# Patient Record
Sex: MALE | Race: WHITE | NOT HISPANIC OR LATINO | Employment: OTHER | URBAN - NONMETROPOLITAN AREA
[De-identification: names, ages, dates, MRNs, and addresses within clinical notes are randomized per-mention and may not be internally consistent; named-entity substitution may affect disease eponyms.]

---

## 2018-12-20 ENCOUNTER — HOSPITAL ENCOUNTER (EMERGENCY)
Facility: HOSPITAL | Age: 68
Discharge: HOME | End: 2018-12-20
Attending: EMERGENCY MEDICINE | Admitting: EMERGENCY MEDICINE
Payer: MEDICARE

## 2018-12-20 ENCOUNTER — APPOINTMENT (EMERGENCY)
Dept: RADIOLOGY | Facility: HOSPITAL | Age: 68
End: 2018-12-20
Payer: MEDICARE

## 2018-12-20 VITALS
HEART RATE: 105 BPM | OXYGEN SATURATION: 91 % | RESPIRATION RATE: 20 BRPM | BODY MASS INDEX: 19.03 KG/M2 | WEIGHT: 132.94 LBS | HEIGHT: 70 IN | TEMPERATURE: 97.2 F | SYSTOLIC BLOOD PRESSURE: 119 MMHG | DIASTOLIC BLOOD PRESSURE: 65 MMHG

## 2018-12-20 DIAGNOSIS — G89.29 CHRONIC RIGHT HIP PAIN: ICD-10-CM

## 2018-12-20 DIAGNOSIS — T40.2X1A OPIOID OVERDOSE, ACCIDENTAL OR UNINTENTIONAL, INITIAL ENCOUNTER (HCC): Primary | ICD-10-CM

## 2018-12-20 DIAGNOSIS — M25.551 CHRONIC RIGHT HIP PAIN: ICD-10-CM

## 2018-12-20 LAB
ACETONE SERPL-MCNC: NEGATIVE MG/DL
ANION GAP SERPL CALCULATED.3IONS-SCNC: 16 MMOL/L (ref 4–13)
APAP SERPL-MCNC: <2 UG/ML (ref 10–30)
BASE EX.OXY STD BLDV CALC-SCNC: 38.1 % (ref 60–80)
BASE EX.OXY STD BLDV CALC-SCNC: 82.6 % (ref 60–80)
BASE EXCESS BLDV CALC-SCNC: -15.3 MMOL/L
BASE EXCESS BLDV CALC-SCNC: -6.4 MMOL/L
BASOPHILS # BLD AUTO: 0.05 THOUSANDS/ΜL (ref 0–0.1)
BASOPHILS NFR BLD AUTO: 0 % (ref 0–1)
BUN SERPL-MCNC: 19 MG/DL (ref 5–25)
CALCIUM SERPL-MCNC: 8.8 MG/DL (ref 8.3–10.1)
CHLORIDE SERPL-SCNC: 100 MMOL/L (ref 100–108)
CO2 SERPL-SCNC: 22 MMOL/L (ref 21–32)
CREAT SERPL-MCNC: 1.1 MG/DL (ref 0.6–1.3)
EOSINOPHIL # BLD AUTO: 0.13 THOUSAND/ΜL (ref 0–0.61)
EOSINOPHIL NFR BLD AUTO: 1 % (ref 0–6)
ERYTHROCYTE [DISTWIDTH] IN BLOOD BY AUTOMATED COUNT: 19.6 % (ref 11.6–15.1)
ETHANOL SERPL-MCNC: <3 MG/DL (ref 0–3)
GFR SERPL CREATININE-BSD FRML MDRD: 70 ML/MIN/1.73SQ M
GLUCOSE SERPL-MCNC: 204 MG/DL (ref 65–140)
HCO3 BLDV-SCNC: 17 MMOL/L (ref 24–30)
HCO3 BLDV-SCNC: 22.4 MMOL/L (ref 24–30)
HCT VFR BLD AUTO: 45.8 % (ref 36.5–49.3)
HGB BLD-MCNC: 12.9 G/DL (ref 12–17)
IMM GRANULOCYTES # BLD AUTO: 0.13 THOUSAND/UL (ref 0–0.2)
IMM GRANULOCYTES NFR BLD AUTO: 1 % (ref 0–2)
LYMPHOCYTES # BLD AUTO: 1.76 THOUSANDS/ΜL (ref 0.6–4.47)
LYMPHOCYTES NFR BLD AUTO: 10 % (ref 14–44)
MAGNESIUM SERPL-MCNC: 2.4 MG/DL (ref 1.6–2.6)
MCH RBC QN AUTO: 20.1 PG (ref 26.8–34.3)
MCHC RBC AUTO-ENTMCNC: 28.2 G/DL (ref 31.4–37.4)
MCV RBC AUTO: 72 FL (ref 82–98)
MONOCYTES # BLD AUTO: 0.26 THOUSAND/ΜL (ref 0.17–1.22)
MONOCYTES NFR BLD AUTO: 2 % (ref 4–12)
NEUTROPHILS # BLD AUTO: 15.17 THOUSANDS/ΜL (ref 1.85–7.62)
NEUTS SEG NFR BLD AUTO: 86 % (ref 43–75)
NRBC BLD AUTO-RTO: 0 /100 WBCS
O2 CT BLDV-SCNC: 16 ML/DL
O2 CT BLDV-SCNC: 7.7 ML/DL
PCO2 BLDV: 58.8 MM HG (ref 42–50)
PCO2 BLDV: 71.2 MM HG (ref 42–50)
PH BLDV: 7 [PH] (ref 7.3–7.4)
PH BLDV: 7.2 [PH] (ref 7.3–7.4)
PLATELET # BLD AUTO: 387 THOUSANDS/UL (ref 149–390)
PMV BLD AUTO: 8.4 FL (ref 8.9–12.7)
PO2 BLDV: 26.9 MM HG (ref 35–45)
PO2 BLDV: 56.6 MM HG (ref 35–45)
POTASSIUM SERPL-SCNC: 4.2 MMOL/L (ref 3.5–5.3)
RBC # BLD AUTO: 6.41 MILLION/UL (ref 3.88–5.62)
SALICYLATES SERPL-MCNC: <3 MG/DL (ref 3–20)
SODIUM SERPL-SCNC: 138 MMOL/L (ref 136–145)
WBC # BLD AUTO: 17.5 THOUSAND/UL (ref 4.31–10.16)

## 2018-12-20 PROCEDURE — 96375 TX/PRO/DX INJ NEW DRUG ADDON: CPT

## 2018-12-20 PROCEDURE — 85025 COMPLETE CBC W/AUTO DIFF WBC: CPT | Performed by: EMERGENCY MEDICINE

## 2018-12-20 PROCEDURE — 99285 EMERGENCY DEPT VISIT HI MDM: CPT

## 2018-12-20 PROCEDURE — 80048 BASIC METABOLIC PNL TOTAL CA: CPT | Performed by: EMERGENCY MEDICINE

## 2018-12-20 PROCEDURE — 36415 COLL VENOUS BLD VENIPUNCTURE: CPT | Performed by: EMERGENCY MEDICINE

## 2018-12-20 PROCEDURE — 80329 ANALGESICS NON-OPIOID 1 OR 2: CPT | Performed by: EMERGENCY MEDICINE

## 2018-12-20 PROCEDURE — 96374 THER/PROPH/DIAG INJ IV PUSH: CPT

## 2018-12-20 PROCEDURE — 73502 X-RAY EXAM HIP UNI 2-3 VIEWS: CPT

## 2018-12-20 PROCEDURE — 96367 TX/PROPH/DG ADDL SEQ IV INF: CPT

## 2018-12-20 PROCEDURE — 80320 DRUG SCREEN QUANTALCOHOLS: CPT | Performed by: EMERGENCY MEDICINE

## 2018-12-20 PROCEDURE — 71045 X-RAY EXAM CHEST 1 VIEW: CPT

## 2018-12-20 PROCEDURE — 96366 THER/PROPH/DIAG IV INF ADDON: CPT

## 2018-12-20 PROCEDURE — 82805 BLOOD GASES W/O2 SATURATION: CPT | Performed by: EMERGENCY MEDICINE

## 2018-12-20 PROCEDURE — 96365 THER/PROPH/DIAG IV INF INIT: CPT

## 2018-12-20 PROCEDURE — 82009 KETONE BODYS QUAL: CPT | Performed by: EMERGENCY MEDICINE

## 2018-12-20 PROCEDURE — 93005 ELECTROCARDIOGRAM TRACING: CPT

## 2018-12-20 PROCEDURE — 83735 ASSAY OF MAGNESIUM: CPT | Performed by: EMERGENCY MEDICINE

## 2018-12-20 RX ORDER — KETOROLAC TROMETHAMINE 30 MG/ML
10 INJECTION, SOLUTION INTRAMUSCULAR; INTRAVENOUS ONCE
Status: COMPLETED | OUTPATIENT
Start: 2018-12-20 | End: 2018-12-20

## 2018-12-20 RX ORDER — GABAPENTIN 300 MG/1
300 CAPSULE ORAL 3 TIMES DAILY
Qty: 60 CAPSULE | Refills: 0 | Status: SHIPPED | OUTPATIENT
Start: 2018-12-20

## 2018-12-20 RX ORDER — ALPRAZOLAM 1 MG/1
TABLET ORAL
COMMUNITY

## 2018-12-20 RX ORDER — ONDANSETRON 2 MG/ML
4 INJECTION INTRAMUSCULAR; INTRAVENOUS ONCE
Status: COMPLETED | OUTPATIENT
Start: 2018-12-20 | End: 2018-12-20

## 2018-12-20 RX ORDER — ONDANSETRON 8 MG/1
8 TABLET, ORALLY DISINTEGRATING ORAL EVERY 8 HOURS PRN
Qty: 20 TABLET | Refills: 0 | Status: SHIPPED | OUTPATIENT
Start: 2018-12-20

## 2018-12-20 RX ORDER — MAGNESIUM SULFATE HEPTAHYDRATE 40 MG/ML
2 INJECTION, SOLUTION INTRAVENOUS ONCE
Status: COMPLETED | OUTPATIENT
Start: 2018-12-20 | End: 2018-12-20

## 2018-12-20 RX ADMIN — KETOROLAC TROMETHAMINE 9.9 MG: 30 INJECTION, SOLUTION INTRAMUSCULAR at 14:32

## 2018-12-20 RX ADMIN — MAGNESIUM SULFATE IN WATER 2 G: 40 INJECTION, SOLUTION INTRAVENOUS at 13:20

## 2018-12-20 RX ADMIN — SODIUM CHLORIDE, SODIUM LACTATE, POTASSIUM CHLORIDE, AND CALCIUM CHLORIDE 2000 ML: .6; .31; .03; .02 INJECTION, SOLUTION INTRAVENOUS at 14:16

## 2018-12-20 RX ADMIN — ONDANSETRON 4 MG: 2 INJECTION, SOLUTION INTRAMUSCULAR; INTRAVENOUS at 13:18

## 2018-12-20 NOTE — ED NOTES
Pt drinking water per Dr Kelly Galvan  Family at bedside   Call Deshaun Coffman within reach     Zay Santana RN  12/20/18 1598

## 2018-12-20 NOTE — ED PROVIDER NOTES
History  Chief Complaint   Patient presents with    Altered Mental Status     found unresponsive on floor by significant other    Was apneic   Had respiratory support with BVM and Given narcan 2 mg  This is a 16-year-old male who presents to the emergency department by EMS for an episode of unresponsiveness occurring at home this afternoon  Patient was reportedly found by his significant other on his bed, unresponsive with occasional brief apnea  Upon EMS arrival, they noted patient to be unresponsive with bradypnea and agonal respirations an approximate respiratory rate of 4-6 with room air saturations of less than 50%  He was given 2 mg naloxone intravenously and had brief bag-mask ventilation performed with rapid return to normal level of consciousness and increase in spontaneous respirations  By time of ED arrival, he is awake but tremulous and intermittently retching during my examination  His significant other was unsure of what specific opioid medications that he uses but believes that he does use them on a regular basis and and that he regularly snorts them to consume them  The patient states that he is prescribed oxycodone for chronic pain of his right hip in which he has had previous surgery  He was unable to specify dose or frequency during my initial examination  Does state that he took a dose of oxycodone today but did not recall the time or dose  Denies any recent illnesses, changes in medications, other coingestants today, or any other recent changes in health  [place] PDMP query was performed; no patient was identified matching patient's name and date of birth  A/p:  Opioid overdose toxidrome with rapid resolution after administration of naloxone  There is essentially no past medical history I can gain from [facility] records  Patient will be observed for resolution of symptoms and for any resedation  Symptomatic control of nausea/shivering while workup ongoing   Plan ekg/labs/cxr/R hip x-ray         History provided by:  EMS personnel, patient and medical records  Altered Mental Status   Associated symptoms: nausea and vomiting    Associated symptoms: no abdominal pain, no fever, no palpitations and no rash        Prior to Admission Medications   Prescriptions Last Dose Informant Patient Reported? Taking? ALPRAZolam (XANAX) 1 mg tablet 12/19/2018 at Unknown time  Yes Yes   Sig: Take by mouth daily at bedtime as needed for anxiety      Facility-Administered Medications: None       Past Medical History:   Diagnosis Date    Asthma     Diabetes mellitus ([facility] [address] )     Rapid heart beat        Past Surgical History:   Procedure Laterality Date    FRACTURE SURGERY      right hip       No family history on file  I have reviewed and agree with the history as documented  Social History   Substance Use Topics    Smoking status: Current Every Day Smoker    Smokeless tobacco: Never Used    Alcohol use No        Review of Systems   Constitutional: Positive for chills and diaphoresis  Negative for fever  Respiratory: Negative for cough and shortness of breath  Cardiovascular: Negative for chest pain and palpitations  Gastrointestinal: Positive for nausea and vomiting  Negative for abdominal pain  Musculoskeletal: Positive for arthralgias (R hip)  Skin: Negative for color change, pallor, rash and wound  Hematological: Negative for adenopathy  Does not bruise/bleed easily  All other systems reviewed and are negative  Physical Exam  Physical Exam   Constitutional: He is oriented to person, place, and time  He appears well-developed  He appears cachectic  He is cooperative  He appears distressed (moderate distress)  HENT:   Head: Normocephalic and atraumatic         Right Ear: Hearing and external ear normal    Left Ear: Hearing and external ear normal    Nose: Nose normal    Mouth/Throat: Uvula is midline, oropharynx is clear and moist and mucous membranes are normal  No oropharyngeal exudate  Eyes: Pupils are equal, round, and reactive to light  Conjunctivae, EOM and lids are normal    Neck: Trachea normal, normal range of motion and phonation normal  Neck supple  No JVD present  No tracheal tenderness, no spinous process tenderness and no muscular tenderness present  No tracheal deviation present  No thyroid mass and no thyromegaly present  Cardiovascular: Regular rhythm, S1 normal, S2 normal, normal heart sounds and intact distal pulses  Tachycardia present  Exam reveals no gallop and no friction rub  No murmur heard  Pulses:       Radial pulses are 2+ on the right side, and 2+ on the left side  Dorsalis pedis pulses are 2+ on the right side, and 2+ on the left side  Posterior tibial pulses are 2+ on the right side, and 2+ on the left side  Pulmonary/Chest: Effort normal and breath sounds normal  No stridor  No respiratory distress  He has no decreased breath sounds  He has no wheezes  He has no rhonchi  He has no rales  He exhibits no tenderness  Abdominal: Soft  He exhibits no distension and no mass  There is no tenderness  There is no rigidity, no rebound, no guarding and no CVA tenderness  Musculoskeletal: Normal range of motion  He exhibits no edema, tenderness or deformity  Lymphadenopathy:     He has no cervical adenopathy  Neurological: He is alert and oriented to person, place, and time  He has normal strength  He displays tremor (diffusely tremulous and shivering during my exam)  No cranial nerve deficit or sensory deficit  He exhibits normal muscle tone  GCS eye subscore is 4  GCS verbal subscore is 5  GCS motor subscore is 6  PERRLA; EOMI  Sensation intact to light touch over face in V1-V3 distribution bilaterally  Facial expressions symmetric  Tongue/uvula midline  Shoulder shrug equal bilaterally  Strength 5/5 in UE/LE bilaterally  Sensation intact to light touch in UE/LE bilaterally  Skin: Skin is warm, dry and intact  No rash noted   He is not diaphoretic  No erythema  Nursing note and vitals reviewed  Vital Signs  ED Triage Vitals   Temperature Pulse Respirations Blood Pressure SpO2   12/20/18 1301 12/20/18 1301 12/20/18 1311 12/20/18 1311 12/20/18 1311   (!) 97 2 °F (36 2 °C) (!) 112 18 135/97 100 %      Temp Source Heart Rate Source Patient Position - Orthostatic VS BP Location FiO2 (%)   12/20/18 1301 12/20/18 1311 12/20/18 1311 12/20/18 1311 --   Temporal Monitor Lying Right arm       Pain Score       12/20/18 1311       No Pain           Vitals:    12/20/18 1405 12/20/18 1435 12/20/18 1505 12/20/18 1707   BP: 106/57 108/62 103/60 119/65   Pulse: 97 85 88 105   Patient Position - Orthostatic VS: Lying Lying Lying Lying       Visual Acuity      ED Medications  Medications    EMS REPLENISHMENT MED (not administered)   ondansetron (ZOFRAN) injection 4 mg (4 mg Intravenous Given 12/20/18 1318)   magnesium sulfate 2 g/50 mL IVPB (premix) 2 g (0 g Intravenous Stopped 12/20/18 1420)   lactated ringers bolus 2,000 mL (0 mL Intravenous Stopped 12/20/18 1616)   ketorolac (TORADOL) injection 9 9 mg (9 9 mg Intravenous Given 12/20/18 1432)       Diagnostic Studies  Results Reviewed     Procedure Component Value Units Date/Time    Pitman draw [[id number]] Collected:  12/20/18 1325    Lab Status:  Final result Specimen:  Blood Updated:  12/20/18 1501    Narrative: The following orders were created for panel order Pitman draw  Procedure                               Abnormality         Status                     ---------                               -----------         ------                     Serna Mendenhall Top on HJRL[[id number]]                           Final result               Gold top on UBOS[[id number]]                                 Final result               Lavender Top 7ml on TQQO[[id number]]                         Final result                 Please view results for these tests on the individual orders      Acetone [[id number]] (Normal) Collected:  12/20/18 1358    Lab Status:  Final result Specimen:  Blood from Arm, Left Updated:  12/20/18 1417     Acetone, Bld Negative    Blood gas, venous [[id number]]  (Abnormal) Collected:  12/20/18 1358    Lab Status:  Final result Specimen:  Blood from Arm, Left Updated:  12/20/18 1405     pH, Boaz 7 198 (L)     pCO2, Boaz 58 8 (H) mm Hg      pO2, Boaz 56 6 (H) mm Hg      HCO3, Boaz 22 4 (L) mmol/L      Base Excess, Boaz -6 4 mmol/L      O2 Content, Boaz 16 0 ml/dL      O2 HGB, VENOUS 82 6 (H) %     UA w Reflex to Microscopic w Reflex to Culture [[id number]]     Lab Status:  No result Specimen:  Urine     Ethanol [[id number]]  (Normal) Collected:  12/20/18 1327    Lab Status:  Final result Specimen:  Blood Updated:  12/20/18 1349     Ethanol Lvl <3 mg/dL     Salicylate level [[id number]]  (Abnormal) Collected:  12/20/18 1325    Lab Status:  Final result Specimen:  Blood from Arm, Right Updated:  64/72/29 7643     Salicylate Lvl <3 (L) mg/dL     Basic metabolic panel [[id number]]  (Abnormal) Collected:  12/20/18 1317    Lab Status:  Final result Specimen:  Blood from Arm, Right Updated:  12/20/18 1340     Sodium 138 mmol/L      Potassium 4 2 mmol/L      Chloride 100 mmol/L      CO2 22 mmol/L      ANION GAP 16 (H) mmol/L      BUN 19 mg/dL      Creatinine 1 10 mg/dL      Glucose 204 (H) mg/dL      Calcium 8 8 mg/dL      eGFR 70 ml/min/1 73sq m     Narrative:         National Kidney Disease Education Program recommendations are as follows:  GFR calculation is accurate only with a steady state creatinine  Chronic Kidney disease less than 60 ml/min/1 73 sq  meters  Kidney failure less than 15 ml/min/1 73 sq  meters      Magnesium [[id number]]  (Normal) Collected:  12/20/18 1317    Lab Status:  Final result Specimen:  Blood from Arm, Right Updated:  12/20/18 1340     Magnesium 2 4 mg/dL     Acetaminophen level [[id number]]  (Abnormal) Collected:  12/20/18 1317    Lab Status:  Final result Specimen:  Blood from Arm, Right Updated:  12/20/18 1340     Acetaminophen Level <2 (L) ug/mL     CBC and differential [[id number]]  (Abnormal) Collected:  12/20/18 1317    Lab Status:  Final result Specimen:  Blood from Arm, Right Updated:  12/20/18 1326     WBC 17 50 (H) Thousand/uL      RBC 6 41 (H) Million/uL      Hemoglobin 12 9 g/dL      Hematocrit 45 8 %      MCV 72 (L) fL      MCH 20 1 (L) pg      MCHC 28 2 (L) g/dL      RDW 19 6 (H) %      MPV 8 4 (L) fL      Platelets 905 Thousands/uL      nRBC 0 /100 WBCs      Neutrophils Relative 86 (H) %      Immat GRANS % 1 %      Lymphocytes Relative 10 (L) %      Monocytes Relative 2 (L) %      Eosinophils Relative 1 %      Basophils Relative 0 %      Neutrophils Absolute 15 17 (H) Thousands/µL      Immature Grans Absolute 0 13 Thousand/uL      Lymphocytes Absolute 1 76 Thousands/µL      Monocytes Absolute 0 26 Thousand/µL      Eosinophils Absolute 0 13 Thousand/µL      Basophils Absolute 0 05 Thousands/µL     Blood gas, venous [[id number]]  (Abnormal) Collected:  12/20/18 1317    Lab Status:  Final result Specimen:  Blood from Arm, Right Updated:  12/20/18 1324     pH, Boaz 6 996 (L)     pCO2, Boaz 71 2 (H) mm Hg      pO2, Boaz 26 9 (L) mm Hg      HCO3, Boaz 17 0 (L) mmol/L      Base Excess, Boaz -15 3 mmol/L      O2 Content, Boaz 7 7 ml/dL      O2 HGB, VENOUS 38 1 (L) %                  XR hip/pelv 2-3 vws right if performed   ED Interpretation by Tessa Briceño DO (12/20 1589)   Degenerative disease of R hip with prior IM sean placement  Bony callus formation  No acute fracture/dislocation  Final Result by Madelyn Irby MD (12/20 6230)      No acute osseous abnormality  Workstation performed: [id number]         XR chest 1 view portable   ED Interpretation by Tessa Briceño DO (12/20 2334)   Airway is midline  Lungs are clear bilaterally with no evidence of pulmonary vascular congestion/focal infiltrate/pleural effusion//pneumothorax   Cardiac and mediastinal silhouettes are within normal limits  Osseous structures appear normal       Final Result by Samy Siu MD (12/20 1544)      No acute cardiopulmonary disease  Workstation performed: [id number]                    Procedures  ECG 12 Lead Documentation  Date/Time: 12/20/2018 1:12 PM  Performed by: Jass Buckner  Authorized by: Jass Buckner     Indications / Diagnosis:  Unresponsive episode  ECG reviewed by me, the ED Provider: yes    Patient location:  ED  Previous ECG:     Previous ECG:  Unavailable    Comparison to cardiac monitor: Yes    Interpretation:     Interpretation: abnormal    Quality:     Tracing quality:  Limited by artifact  Rate:     ECG rate:  116    ECG rate assessment: tachycardic    Rhythm:     Rhythm: sinus tachycardia    Ectopy:     Ectopy: none    QRS:     QRS axis:  Right    QRS intervals:  Normal  Conduction:     Conduction: normal    ST segments:     ST segments:  Normal  T waves:     T waves: normal    Comments:      Pr 136 qrs 80 qtc 428           Phone Contacts  ED Phone Contact    ED Course  ED Course as of Dec 20 1740   Thu Dec 20, 2018   1325 VBG: marked acidemia with mixed respiratory and metabolic components  As patient is spontaneously breathing now and tachypneic--with RR approx 22-25 during my exam--will repeat in 20 min and if not improving will add NIPPV to compensate  1358 1  WBC elevated  2  Hg/Hct wnl   3  Plt wnl   4  Electrolytes wnl  Mild hyperglycemia  5  EtOH/salicylate/apap negative  6  Negative acetone  1416 Repeat VBG: improving acidemia with improvement of both metabolic and respiratory acidoses  Hyperoxia  Patient fully recovered throughout the course of his ER stay and was awake and alert upon multiple re-evaluations  Upon further discussion with him and his family at bedside, he states that he took an unknown left-over medication today that he believed was a heart medication that he is prescribed for a heart condition that he could not specify    He described medication as a small white pill that was being stored in an unlabeled bottle  He then laid down on his bed and shortly thereafter lost consciousness  His wife at bedside confirms that she found him unresponsive and apneic with intermittent sonorous respirations when he was physically stimulated  She then contacted EMS  Per his description of a tachyarrhythmia that required cardioversion and the fact that he states he is supposed to be prescribed Xarelto, I suspect he is referring to atrial fibrillation/flutter as his underlying cardiac disorder  He was unsure of the full details however  States he took this medication by accident and specifically does not want any additional opiate medications to treat his chronic right hip pain  The hip injury occurred in 2016 from a fall while he was working  He has been treated previously by pain management physician for this in [place] but is not currently following with any physicians  I emphasized that he will need good follow-up to establish medical care for his underlying cardiac issues as well as his chronic right hip pain  I would of course not prescribe any opioid medications given the episode that happened today  He states he will dispose of the remaining medications that he took today  Prescriptions were given for gabapentin (as patient states he has previously taken pregabalin with good control of symptoms; he does not have prescription drug coverage currently and would prefer a cheaper agent if possible) and ondansetron  I also recommended to use full-dose aspirin once daily until he is able to establish primary care and prescription drug coverage as he states he will not be able to fill any prescriptions for anticoagulant medications at present  I emphasized to him that this is not an adequate replacement for full anticoagulation and he will require status point of full anticoagulation therapy as soon as possible   This does not replace the need for formal evaluation and ongoing management by a primary physician  He and his wife both stated that they understood this  I emphasized again to him the need for follow-up with primary physician and pain management physician  All questions answered prior to discharge  The patient expressed understanding and agreed to plan  MDM  Number of Diagnoses or Management Options  Chronic right hip pain: new and does not require workup  Opioid overdose, accidental or unintentional, initial encounter [facility]): new and does not require workup     Amount and/or Complexity of Data Reviewed  Clinical lab tests: ordered and reviewed  Tests in the radiology section of CPT®: ordered and reviewed  Decide to obtain previous medical records or to obtain history from someone other than the patient: yes  Obtain history from someone other than the patient: yes  Review and summarize past medical records: yes  Discuss the patient with other providers: yes  Independent visualization of images, tracings, or specimens: yes    Risk of Complications, Morbidity, and/or Mortality  Presenting problems: high  Diagnostic procedures: high  Management options: high    Patient Progress  Patient progress: improved    The patient presented with a condition in which there was a high probability of imminent or life-threatening deterioration, and critical care services (excluding separately billable procedures) totalled 30-74 minutes          Disposition  Final diagnoses:   Opioid overdose, accidental or unintentional, initial encounter ([facility] [address] )   Chronic right hip pain     Time reflects when diagnosis was documented in both MDM as applicable and the Disposition within this note     Time User Action Codes Description Comment    12/20/2018  4:47 PM [place] Bolaños [facility] Opioid overdose, accidental or unintentional, initial encounter [facility])     12/20/2018  4:48 PM Zelphia Pump Add [R82 [id number],  G89 29] Chronic right hip pain ED Disposition     ED Disposition Condition Comment    Discharge  Mychale Kanner discharge to home/self care  Condition at discharge: Good        Follow-up Information     Follow up With Specialties Details Why Contact Info    Referral physicians  Call To establish primary care     Yohannes Foley MD Pain Medicine Call in 1 day For an appointment for further evaluation (pain management) [address],  [address]  [phone]            Discharge Medication List as of 12/20/2018  4:50 PM      START taking these medications    Details   gabapentin (NEURONTIN) 300 mg capsule Take 1 capsule (300 mg total) by mouth 3 (three) times a day, Starting Thu 12/20/2018, Normal      ondansetron (ZOFRAN-ODT) 8 mg disintegrating tablet Take 1 tablet (8 mg total) by mouth every 8 (eight) hours as needed for nausea or vomiting, Starting Thu 12/20/2018, Normal         CONTINUE these medications which have NOT CHANGED    Details   ALPRAZolam (XANAX) 1 mg tablet Take by mouth daily at bedtime as needed for anxiety, Historical Med           No discharge procedures on file      ED Provider  Electronically Signed by           Rey Guzman DO  12/20/18 2026

## 2018-12-20 NOTE — DISCHARGE INSTRUCTIONS
Hip Pain   WHAT YOU NEED TO KNOW:   Hip pain can be caused by a number of conditions, such as bursitis, arthritis, or muscle or tendon strain  X-rays do not show broken bones  You may have swelling in the fluid-filled sacs that protect your muscles and tendons  Hip pain can also be caused by a lower back problem  Hip pain may be caused by trauma, playing sports, or running  Your pain may start in your hip and go to your thigh, buttock, or groin  DISCHARGE INSTRUCTIONS:   Medicines:   · NSAIDs , such as ibuprofen, help decrease swelling, pain, and fever  This medicine is available with or without a doctor's order  NSAIDs can cause stomach bleeding or kidney problems in certain people  If you take blood thinner medicine, always ask your healthcare provider if NSAIDs are safe for you  Always read the medicine label and follow directions  · Take your medicine as directed  Contact your healthcare provider if you think your medicine is not helping or if you have side effects  Tell him of her if you are allergic to any medicine  Keep a list of the medicines, vitamins, and herbs you take  Include the amounts, and when and why you take them  Bring the list or the pill bottles to follow-up visits  Carry your medicine list with you in case of an emergency  Return to the emergency department if:   · Your pain gets worse  · You have numbness in your leg or toes  · You cannot put any weight on or move your hip  Contact your healthcare provider if:   · You have a fever  · Your pain does not decrease, even after treatment  · You have questions or concerns about your condition or care  Follow up with your healthcare provider as directed: You may need physical therapy, an injection, or more testing  You may need to see an orthopedic specialist  Write down your questions so you remember to ask them during your visits    Manage your hip pain:   · Rest  your injured hip so that it can heal  You may need to avoid putting any weight on your hip for at least 48 hours  Return to normal activities as directed  · Ice  the injury for 20 minutes every 4 hours, or as directed  Use an ice pack, or put crushed ice in a plastic bag  Cover it with a towel to protect your skin  Ice helps prevent tissue damage and decreases swelling and pain  · Elevate  your injured hip above the level of your heart as often as you can  This will help decrease swelling and pain  If possible, prop your hip and leg on pillows or blankets to keep the area elevated comfortably  · Maintain a healthy weight  Extra body weight can cause pressure and pain in your hip, knee, and ankle joints  Ask your healthcare provider how much you should weigh  Ask him to help you create a weight loss plan if you are overweight  · Use assistive devices as directed  You may need to use a cane or crutches  Assistive devices help decrease pain and pressure on your hip when you walk  Ask your healthcare provider for more information about assistive devices and how to use them correctly  © 2017 [facility] Information is for End User's use only and may not be sold, redistributed or otherwise used for commercial purposes  All illustrations and images included in CareNotes® are the copyrighted property of [employer]  or [employer]  The above information is an  only  It is not intended as medical advice for individual conditions or treatments  Talk to your doctor, nurse or pharmacist before following any medical regimen to see if it is safe and effective for you  Opioid Overdose   WHAT YOU NEED TO KNOW:   Opioids are prescription medicines used to treat pain  Some examples include morphine, codeine, methadone, oxycodone, and hydrocodone  An overdose can occur if you take more than the recommended amount   It can also occur if you take opioids with alcohol or certain medicines that can cause harm if taken together  An overdose can also occur if you take an opioid that was prescribed for someone else  Learn to take these medicines safely  An opioid overdose can be life-threatening  DISCHARGE INSTRUCTIONS:   Call 911 for any of the following:   · You have trouble staying awake and your breathing is slow or shallow  Return to the emergency department if:   · Your speech is slurred, or you are confused  · You are dizzy or stumble when you walk  · You are extremely drowsy, or you have trouble staying awake or speaking  · Your body is limp  · You have pale or clammy skin  · You have blue fingernails or lips  · Your heartbeat is slower than normal   Contact your healthcare provider if:   · You have questions or concerns about your condition or care  Follow up with your healthcare provider as directed:  Write down your questions so you remember to ask them during your visits  Prevent opioid overdose:   · Take your medicine exactly as directed  Do not take more of the recommended amount of opioids each time you take it  Do not take opioids more often than recommended  If you use a pain patch, be sure to remove the old patch before you place a new one  · Do not take opioids that belong to someone else  The amount of opioids that person takes may not be right for you  · Do not mix opioids with alcohol, sleeping pills, or street drugs  The combination of these substances can cause an overdose  · Learn about the signs of an overdose  so you know how to respond  Tell others about these symptoms so they will know what to do if needed  Talk to your healthcare provider about naloxone  In some states, you may be able to keep naloxone at home in case of an overdose  Your family and friends can also be trained on how to give it to you if needed  · Keep opioids out of the reach of children  Store opioids in a locked cabinet or in a location that children cannot get to   Ask your healthcare provider how to dispose of any unused opioid medicines  Counseling: Your healthcare provider may recommend that you see a counselor if you are abusing opioids  © 2017 [address] Information is for End User's use only and may not be sold, redistributed or otherwise used for commercial purposes  All illustrations and images included in CareNotes® are the copyrighted property of [employer]  or [employer]  The above information is an  only  It is not intended as medical advice for individual conditions or treatments  Talk to your doctor, nurse or pharmacist before following any medical regimen to see if it is safe and effective for you

## 2018-12-21 LAB
ATRIAL RATE: 116 BPM
P AXIS: 82 DEGREES
PR INTERVAL: 136 MS
QRS AXIS: 90 DEGREES
QRSD INTERVAL: 80 MS
QT INTERVAL: 308 MS
QTC INTERVAL: 428 MS
T WAVE AXIS: 50 DEGREES
VENTRICULAR RATE: 116 BPM

## 2018-12-21 PROCEDURE — 93010 ELECTROCARDIOGRAM REPORT: CPT | Performed by: INTERNAL MEDICINE
